# Patient Record
Sex: MALE | URBAN - METROPOLITAN AREA
[De-identification: names, ages, dates, MRNs, and addresses within clinical notes are randomized per-mention and may not be internally consistent; named-entity substitution may affect disease eponyms.]

---

## 2020-10-01 ENCOUNTER — NEW PATIENT (OUTPATIENT)
Dept: URBAN - METROPOLITAN AREA CLINIC 43 | Facility: CLINIC | Age: 55
End: 2020-10-01
Payer: COMMERCIAL

## 2020-10-01 DIAGNOSIS — H25.13 AGE-RELATED NUCLEAR CATARACT, BILATERAL: ICD-10-CM

## 2020-10-01 DIAGNOSIS — H40.033 ANATOMICAL NARROW ANGLE, BILATERAL: Primary | ICD-10-CM

## 2020-10-01 DIAGNOSIS — E11.9 TYPE 2 DIABETES MELLITUS WITHOUT COMPLICATIONS: ICD-10-CM

## 2020-10-01 DIAGNOSIS — Z79.84 LONG TERM (CURRENT) USE OF ORAL ANTIDIABETIC DRUGS: ICD-10-CM

## 2020-10-01 PROCEDURE — 92134 CPTRZ OPH DX IMG PST SGM RTA: CPT | Performed by: OPTOMETRIST

## 2020-10-01 PROCEDURE — 92004 COMPRE OPH EXAM NEW PT 1/>: CPT | Performed by: OPTOMETRIST

## 2020-10-01 ASSESSMENT — INTRAOCULAR PRESSURE
OS: 12
OD: 12

## 2020-10-01 ASSESSMENT — KERATOMETRY
OS: 45.75
OD: 45.00

## 2020-10-27 ENCOUNTER — FOLLOW UP ESTABLISHED (OUTPATIENT)
Dept: URBAN - METROPOLITAN AREA CLINIC 43 | Facility: CLINIC | Age: 55
End: 2020-10-27
Payer: COMMERCIAL

## 2020-10-27 PROCEDURE — 92083 EXTENDED VISUAL FIELD XM: CPT | Performed by: OPTOMETRIST

## 2020-10-27 PROCEDURE — 76514 ECHO EXAM OF EYE THICKNESS: CPT | Performed by: OPTOMETRIST

## 2020-10-27 PROCEDURE — 92133 CPTRZD OPH DX IMG PST SGM ON: CPT | Performed by: OPTOMETRIST

## 2020-10-27 PROCEDURE — 92012 INTRM OPH EXAM EST PATIENT: CPT | Performed by: OPTOMETRIST

## 2020-10-27 ASSESSMENT — INTRAOCULAR PRESSURE
OD: 16
OS: 15

## 2021-04-13 ENCOUNTER — OFFICE VISIT (OUTPATIENT)
Dept: URBAN - METROPOLITAN AREA CLINIC 43 | Facility: CLINIC | Age: 56
End: 2021-04-13
Payer: COMMERCIAL

## 2021-04-13 PROCEDURE — 92083 EXTENDED VISUAL FIELD XM: CPT | Performed by: OPTOMETRIST

## 2021-04-13 PROCEDURE — 99213 OFFICE O/P EST LOW 20 MIN: CPT | Performed by: OPTOMETRIST

## 2021-04-13 ASSESSMENT — INTRAOCULAR PRESSURE
OS: 15
OD: 15

## 2021-04-13 NOTE — IMPRESSION/PLAN
Impression: Anatomical narrow angle, bilateral: H40.033.
slightly thin pachs
family history: uncle Plan: s/p LPI OU, patent IOP low OU
OCT RNFL 10/27/20: OD: no thinning, OS: bdl inf thin HVF today: fairly reliable OU, OD: inferior defects (not same as 10/27/20 defects), OS: sup nasal step, inferior arcuate (non-repeatable)
will cont. to monitor without gtts CE with OCT RNFL in 6 months

## 2023-04-20 ENCOUNTER — OFFICE VISIT (OUTPATIENT)
Dept: URBAN - METROPOLITAN AREA CLINIC 43 | Facility: CLINIC | Age: 58
End: 2023-04-20
Payer: COMMERCIAL

## 2023-04-20 DIAGNOSIS — H40.033 ANATOMICAL NARROW ANGLE, BILATERAL: ICD-10-CM

## 2023-04-20 DIAGNOSIS — H25.13 AGE-RELATED NUCLEAR CATARACT, BILATERAL: ICD-10-CM

## 2023-04-20 DIAGNOSIS — E11.9 TYPE 2 DIABETES MELLITUS WITHOUT COMPLICATIONS: Primary | ICD-10-CM

## 2023-04-20 DIAGNOSIS — Z79.84 LONG TERM (CURRENT) USE OF ORAL ANTIDIABETIC DRUGS: ICD-10-CM

## 2023-04-20 PROCEDURE — 92133 CPTRZD OPH DX IMG PST SGM ON: CPT | Performed by: OPTOMETRIST

## 2023-04-20 PROCEDURE — 99214 OFFICE O/P EST MOD 30 MIN: CPT | Performed by: OPTOMETRIST

## 2023-04-20 ASSESSMENT — KERATOMETRY
OS: 45.63
OD: 45.00

## 2023-04-20 ASSESSMENT — INTRAOCULAR PRESSURE
OS: 16
OD: 15

## 2023-04-20 ASSESSMENT — VISUAL ACUITY
OD: 20/20
OS: 20/20

## 2023-04-20 NOTE — IMPRESSION/PLAN
Impression: Anatomical narrow angle, bilateral: H40.033.
slightly thin pachs
family history: uncle Plan: lost to f/u

s/p LPI OU, patent IOP low OU
OCT RNFL today: no thinning OU HVF 04/2021: fairly reliable OU, OD: inferior defects (not same as 10/27/20 defects), OS: sup nasal step, inferior arcuate (non-repeatable)
low risk, monitor annually with CE/RNFL
will cont.  to monitor without gtts